# Patient Record
Sex: MALE | Race: WHITE | NOT HISPANIC OR LATINO | Employment: UNEMPLOYED | URBAN - METROPOLITAN AREA
[De-identification: names, ages, dates, MRNs, and addresses within clinical notes are randomized per-mention and may not be internally consistent; named-entity substitution may affect disease eponyms.]

---

## 2023-05-19 ENCOUNTER — TELEPHONE (OUTPATIENT)
Dept: FAMILY MEDICINE | Facility: CLINIC | Age: 33
End: 2023-05-19

## 2023-05-19 NOTE — TELEPHONE ENCOUNTER
Patient reports that his appointment with the provider below was cancelled today.  He states that he had been out of medication for 3-4 days and then got an emergency 3 day supply from his pharmacy. He only has 1 tablet left and needs a refill of his medication. He feels that he is having withdrawal symptoms - brain zapping (jolt), he is irritable and doesn't feel like himself. He fells sick and nauseated.      Nursing advice: In looking in his chart he was set up for a video appointment and is not an established patient.  It was requested that he be seen in person for this initial appointment per policy. Donnie reports that he cannot make it to this appointment in person.  I put the patient on hold to have a conversation with the RN team on how to best support the patient. When I returned to the call the call was gone. I called Donnie back. He stated that he as irritated and hung up the call when asked about the loss of the call.  I inquired on who prescribed the medication originally as I was going to instructed him to call there and request a refill until he can get rescheduled and he could not recall who had previously prescribed the medication. He stated forget it, I'll figure this out myself and then hung up the phone.  I called back and left a message, on a VM that identified the patient, that with his current symptoms and only having one tablet left that he should report immediately to the E.R. for assessment and possible medication refill. I left the phone number [phone] to call if he wanted to reschedule his appointment for long term medication management. To provider below as a FYI.  Thank you. Ghada Floyd R.N.

## 2023-05-19 NOTE — TELEPHONE ENCOUNTER
warren Meeks of patient, calling to ask for a refill of patient's medication. She states patient trying to establish care, but his virtual visit was canceled, needs to be in person. RN stated could not speak with warren unless patient gave consent to communicate, eventually she arrived home to patient and he verbalized consent to speak with Jeanna. RN validated frustration that appointment was canceled because he has not established care with [facility], so we cannot offer a refill of any medications. Patient recommended to seek out previous provider who prescribed medication to ask for a lizette refill while he establishes care, or pharmacy, and if patient experiences withdrawals, then it is appropriate to go to ED for care. Patient and warren verbalized understanding. RN offered to make appointment for patient and he agreed, scheduled in person on 6/2/23 with new PCP. Patient denies any other needs at this time.     Damián Wild, DANIELN, RN, PHN  [facility]

## 2023-06-30 ENCOUNTER — OFFICE VISIT (OUTPATIENT)
Dept: FAMILY MEDICINE | Facility: CLINIC | Age: 33
End: 2023-06-30

## 2023-06-30 VITALS
DIASTOLIC BLOOD PRESSURE: 69 MMHG | OXYGEN SATURATION: 99 % | TEMPERATURE: 97.6 F | SYSTOLIC BLOOD PRESSURE: 100 MMHG | WEIGHT: 143 LBS | BODY MASS INDEX: 22.44 KG/M2 | HEIGHT: 67 IN | HEART RATE: 76 BPM

## 2023-06-30 DIAGNOSIS — F41.0 PANIC DISORDER WITHOUT AGORAPHOBIA: ICD-10-CM

## 2023-06-30 DIAGNOSIS — F33.1 MODERATE EPISODE OF RECURRENT MAJOR DEPRESSIVE DISORDER (H): ICD-10-CM

## 2023-06-30 DIAGNOSIS — G47.00 INSOMNIA, UNSPECIFIED TYPE: ICD-10-CM

## 2023-06-30 DIAGNOSIS — R20.2 PARESTHESIA: ICD-10-CM

## 2023-06-30 DIAGNOSIS — F41.9 ANXIETY: Primary | ICD-10-CM

## 2023-06-30 DIAGNOSIS — F10.21 ALCOHOL DEPENDENCE IN REMISSION (H): ICD-10-CM

## 2023-06-30 LAB — HBA1C MFR BLD: 5 % (ref 0–5.6)

## 2023-06-30 PROCEDURE — 36415 COLL VENOUS BLD VENIPUNCTURE: CPT | Performed by: NURSE PRACTITIONER

## 2023-06-30 PROCEDURE — 83036 HEMOGLOBIN GLYCOSYLATED A1C: CPT | Performed by: NURSE PRACTITIONER

## 2023-06-30 PROCEDURE — 82607 VITAMIN B-12: CPT | Performed by: NURSE PRACTITIONER

## 2023-06-30 PROCEDURE — 84443 ASSAY THYROID STIM HORMONE: CPT | Performed by: NURSE PRACTITIONER

## 2023-06-30 PROCEDURE — 99204 OFFICE O/P NEW MOD 45 MIN: CPT | Performed by: NURSE PRACTITIONER

## 2023-06-30 PROCEDURE — 82728 ASSAY OF FERRITIN: CPT | Performed by: NURSE PRACTITIONER

## 2023-06-30 PROCEDURE — 80053 COMPREHEN METABOLIC PANEL: CPT | Performed by: NURSE PRACTITIONER

## 2023-06-30 RX ORDER — PAROXETINE 30 MG/1
30 TABLET, FILM COATED ORAL EVERY MORNING
Qty: 30 TABLET | Refills: 2 | Status: SHIPPED | OUTPATIENT
Start: 2023-06-30 | End: 2023-10-12

## 2023-06-30 RX ORDER — PAROXETINE 30 MG/1
1 TABLET, FILM COATED ORAL DAILY
COMMUNITY
Start: 2023-05-17 | End: 2023-06-30

## 2023-06-30 RX ORDER — GABAPENTIN 300 MG/1
300 CAPSULE ORAL 3 TIMES DAILY
Qty: 90 CAPSULE | Refills: 2 | Status: SHIPPED | OUTPATIENT
Start: 2023-06-30 | End: 2024-01-12

## 2023-06-30 RX ORDER — GABAPENTIN 300 MG/1
1-2 CAPSULE ORAL PRN
COMMUNITY
Start: 2023-03-22 | End: 2023-06-30

## 2023-06-30 RX ORDER — HYDROXYZINE PAMOATE 25 MG/1
25-50 CAPSULE ORAL 3 TIMES DAILY PRN
Qty: 30 CAPSULE | Refills: 2 | Status: SHIPPED | OUTPATIENT
Start: 2023-06-30

## 2023-06-30 NOTE — PATIENT INSTRUCTIONS
Restart Paxil 30 mg once daily.  Restart gabapentin 300 mg three times per day.  Labs today to look into nerve pain.  I would also like you to see a neurologist for the nerve pain.  I placed a referral to psychiatry too, to help determine if we are treating your mental health correctly.

## 2023-06-30 NOTE — PROGRESS NOTES
Assessment & Plan     Anxiety  He was given a 3 month refill.  I would like him to follow-up with me at that time.  He has had really inconsistent follow-up and we discussed that.  Can try hydroxyzine for anxiety or sleep as needed.   Referral to mental health to help with diagnosis, question if he might have bipolar 2 disorder with some of the statement he made.    - PARoxetine (PAXIL) 30 MG tablet; Take 1 tablet (30 mg) by mouth every morning  - hydrOXYzine (VISTARIL) 25 MG capsule; Take 1-2 capsules (25-50 mg) by mouth 3 times daily as needed for anxiety or other (sleep)  - Adult Mental Health  Referral; Future    Moderate episode of recurrent major depressive disorder (H)  See above.     - PARoxetine (PAXIL) 30 MG tablet; Take 1 tablet (30 mg) by mouth every morning  - Adult Mental Health  Referral; Future    Panic disorder without agoraphobia  See above.     - PARoxetine (PAXIL) 30 MG tablet; Take 1 tablet (30 mg) by mouth every morning  - Adult Mental Health  Referral; Future    Paresthesia  Reviewed podiatry notes, states paresthesia is idiopathic.  Hasn't had work-up.  Possibly related to past heavy alcohol use.  Labs in process.  I would like him to see neurology. I refilled gabapentin x 3 months, but advised he must see neurology for me to continue medication.    - Adult Neurology  Referral; Future  - gabapentin (NEURONTIN) 300 MG capsule; Take 1 capsule (300 mg) by mouth 3 times daily  - TSH with free T4 reflex  - Vitamin B12  - Ferritin  - Hemoglobin A1c  - Comprehensive metabolic panel (BMP + Alb, Alk Phos, ALT, AST, Total. Bili, TP)    Insomnia, unspecified type  Trial of hydroxyzine.     - hydrOXYzine (VISTARIL) 25 MG capsule; Take 1-2 capsules (25-50 mg) by mouth 3 times daily as needed for anxiety or other (sleep)    Alcohol dependence in remission (H)  States no alcohol x 8 months.  Stopped on his own.     - Comprehensive metabolic panel (BMP + Alb, Alk Phos,  ALT, AST, Total. Bili, TP)      Bozena Ramsey, CNP  [facility]    Maryjane Fields is a 32 year old, presenting for the following health issues:  Depression        6/30/2023    10:13 AM   Additional Questions   Roomed by jigna   Accompanied by girlfriend     History of Present Illness       Reason for visit:  I need my pills    He eats 0-1 servings of fruits and vegetables daily.He consumes 3 sweetened beverage(s) daily.He exercises with enough effort to increase his heart rate 60 or more minutes per day.  He exercises with enough effort to increase his heart rate 4 days per week. He is missing 1 dose(s) of medications per week.       New patient to clinic.  Needs med refills.   Has been on Paxil 30 mg.  Takes for anxiety, depression, panic disorder.  Has been out of medication for 5 days and it is making him feel bad. Chart review confirms he has been on this dose.  Appears that it has been prescribed by a few different providers, he hasn't been very consistent in his follow-up.   Saw a psychistrist and did therapy years ago.    Has insomnia. Trazodone left a weird taste in his mouth.  Dad's seroquel works.  Patient has not been personally prescribed Seroquel.    Taking gabapentin 300 mg TID for nerve pain in his feet.  Saw podiatry, hx of club feet with past surgery.  Per podiatry note, neuropathy is idiopathic.  Gabapentin was tried for 1 month and then patient was told to see PCP.  The gabapentin was prescribed in March 2023 per , none since and he is out.  Medication seemed to help a lot.  He wonders if neuropathy is from past alcohol use. Was drinking every day all day long.  12 beers, 6 shots or more per day.  Has not been drinking now for about 8 months, quit on his own.    Smokes marijuana, no other drugs.    Denies any SI or HI.         Review of Systems   ROS: 10 point ROS neg other than the symptoms noted above in the HPI.           Objective    /69   Pulse 76   Temp  "97.6  F (36.4  C)   Ht 1.702 m (5' 7\")   Wt 64.9 kg (143 lb)   SpO2 99%   BMI 22.40 kg/m    Body mass index is 22.4 kg/m .  Physical Exam   GENERAL: healthy, alert and no distress  EYES: Eyes grossly normal to inspection, PERRL and conjunctivae and sclerae normal  BREAST: normal without masses, tenderness or nipple discharge and no palpable axillary masses or adenopathy  MS: no gross musculoskeletal defects noted, no edema  SKIN: no suspicious lesions or rashes  NEURO: Normal strength and tone, mentation intact and speech normal  PSYCH: mentation appears normal and slightly anxious                  "

## 2023-07-01 LAB
ALBUMIN SERPL BCG-MCNC: 4.3 G/DL (ref 3.5–5.2)
ALP SERPL-CCNC: 60 U/L (ref 40–129)
ALT SERPL W P-5'-P-CCNC: 18 U/L (ref 0–70)
ANION GAP SERPL CALCULATED.3IONS-SCNC: 11 MMOL/L (ref 7–15)
AST SERPL W P-5'-P-CCNC: 27 U/L (ref 0–45)
BILIRUB SERPL-MCNC: 0.3 MG/DL
BUN SERPL-MCNC: 11.1 MG/DL (ref 6–20)
CALCIUM SERPL-MCNC: 9.1 MG/DL (ref 8.6–10)
CHLORIDE SERPL-SCNC: 104 MMOL/L (ref 98–107)
CREAT SERPL-MCNC: 1.04 MG/DL (ref 0.67–1.17)
DEPRECATED HCO3 PLAS-SCNC: 25 MMOL/L (ref 22–29)
FERRITIN SERPL-MCNC: 29 NG/ML (ref 31–409)
GFR SERPL CREATININE-BSD FRML MDRD: >90 ML/MIN/1.73M2
GLUCOSE SERPL-MCNC: 95 MG/DL (ref 70–99)
POTASSIUM SERPL-SCNC: 4.7 MMOL/L (ref 3.4–5.3)
PROT SERPL-MCNC: 6.6 G/DL (ref 6.4–8.3)
SODIUM SERPL-SCNC: 140 MMOL/L (ref 136–145)
TSH SERPL DL<=0.005 MIU/L-ACNC: 2.68 UIU/ML (ref 0.3–4.2)
VIT B12 SERPL-MCNC: 633 PG/ML (ref 232–1245)

## 2023-07-08 ENCOUNTER — HEALTH MAINTENANCE LETTER (OUTPATIENT)
Age: 33
End: 2023-07-08

## 2023-10-12 DIAGNOSIS — F33.1 MODERATE EPISODE OF RECURRENT MAJOR DEPRESSIVE DISORDER (H): ICD-10-CM

## 2023-10-12 DIAGNOSIS — F41.9 ANXIETY: ICD-10-CM

## 2023-10-12 DIAGNOSIS — F41.0 PANIC DISORDER WITHOUT AGORAPHOBIA: ICD-10-CM

## 2023-10-12 RX ORDER — PAROXETINE 30 MG/1
TABLET, FILM COATED ORAL
Qty: 30 TABLET | Refills: 1 | Status: SHIPPED | OUTPATIENT
Start: 2023-10-12 | End: 2024-01-12

## 2023-10-12 NOTE — LETTER
October 12, 2023    Donnie Doan  [address] [place] [address]    Dear Donnie,       We recently received a refill request for PARoxetine (PAXIL) 30 MG tablet.  We have refilled this for a one time 30 day supply only because you are due for a:    Follow Up office visit      Please call at your earliest convenience so that there will not be a delay with your future refills.          Thank you,   Your [facility] Team/AL  [phone]

## 2024-01-10 DIAGNOSIS — F33.1 MODERATE EPISODE OF RECURRENT MAJOR DEPRESSIVE DISORDER (H): ICD-10-CM

## 2024-01-10 DIAGNOSIS — F41.9 ANXIETY: ICD-10-CM

## 2024-01-10 DIAGNOSIS — R20.2 PARESTHESIA: ICD-10-CM

## 2024-01-10 DIAGNOSIS — F41.0 PANIC DISORDER WITHOUT AGORAPHOBIA: ICD-10-CM

## 2024-01-11 RX ORDER — GABAPENTIN 300 MG/1
300 CAPSULE ORAL 3 TIMES DAILY
Qty: 90 CAPSULE | Refills: 2 | OUTPATIENT
Start: 2024-01-11

## 2024-01-11 RX ORDER — PAROXETINE 30 MG/1
TABLET, FILM COATED ORAL
Qty: 30 TABLET | Refills: 1 | OUTPATIENT
Start: 2024-01-11

## 2024-01-11 NOTE — TELEPHONE ENCOUNTER
Sent [employer] message due to number being out of service.        Patrice Narayanan

## 2024-01-12 ENCOUNTER — VIRTUAL VISIT (OUTPATIENT)
Dept: FAMILY MEDICINE | Facility: CLINIC | Age: 34
End: 2024-01-12

## 2024-01-12 DIAGNOSIS — R20.2 PARESTHESIA: ICD-10-CM

## 2024-01-12 DIAGNOSIS — K21.9 GASTROESOPHAGEAL REFLUX DISEASE, UNSPECIFIED WHETHER ESOPHAGITIS PRESENT: Primary | ICD-10-CM

## 2024-01-12 DIAGNOSIS — F33.1 MODERATE EPISODE OF RECURRENT MAJOR DEPRESSIVE DISORDER (H): ICD-10-CM

## 2024-01-12 DIAGNOSIS — F41.9 ANXIETY: ICD-10-CM

## 2024-01-12 DIAGNOSIS — F41.0 PANIC DISORDER WITHOUT AGORAPHOBIA: ICD-10-CM

## 2024-01-12 PROCEDURE — 99214 OFFICE O/P EST MOD 30 MIN: CPT | Mod: 95 | Performed by: NURSE PRACTITIONER

## 2024-01-12 RX ORDER — FAMOTIDINE 20 MG/1
20 TABLET, FILM COATED ORAL DAILY
Qty: 30 TABLET | Refills: 11 | Status: SHIPPED | OUTPATIENT
Start: 2024-01-12

## 2024-01-12 RX ORDER — ACETAMINOPHEN 325 MG/1
325-650 TABLET ORAL EVERY 6 HOURS PRN
COMMUNITY
Start: 2022-11-04

## 2024-01-12 RX ORDER — IBUPROFEN 200 MG
200 TABLET ORAL EVERY 6 HOURS PRN
COMMUNITY

## 2024-01-12 RX ORDER — PAROXETINE 30 MG/1
TABLET, FILM COATED ORAL
Qty: 30 TABLET | Refills: 6 | Status: SHIPPED | OUTPATIENT
Start: 2024-01-12 | End: 2024-08-22

## 2024-01-12 RX ORDER — GABAPENTIN 300 MG/1
300 CAPSULE ORAL 2 TIMES DAILY
Qty: 60 CAPSULE | Refills: 6 | Status: SHIPPED | OUTPATIENT
Start: 2024-01-12 | End: 2024-07-24

## 2024-01-12 NOTE — PROGRESS NOTES
Donnie is a 33 year old who is being evaluated via a billable video visit.      How would you like to obtain your AVS? MyChart  If the video visit is dropped, the invitation should be resent by: Text to cell phone: [phone]  Will anyone else be joining your video visit? No          Assessment & Plan     Anxiety  *  - PARoxetine (PAXIL) 30 MG tablet  Dispense: 30 tablet; Refill: 6    Moderate episode of recurrent major depressive disorder (H)    - PARoxetine (PAXIL) 30 MG tablet  Dispense: 30 tablet; Refill: 6    Panic disorder without agoraphobia  *  - PARoxetine (PAXIL) 30 MG tablet  Dispense: 30 tablet; Refill: 6    Paresthesia  *  - gabapentin (NEURONTIN) 300 MG capsule  Dispense: 60 capsule; Refill: 6    Gastroesophageal reflux disease, unspecified whether esophagitis present  *  - famotidine (PEPCID) 20 MG tablet  Dispense: 30 tablet; Refill: 11    -Ongoing anxiety and depression.  Paxil has been refilled.  Psychiatry phone number was given to patient, I strongly suggest that he follow-up with him in regards to his Paxil and diagnosis.  He agreed to the plan.   -I praised him for not drinking alcohol anymore.   -Acid reflux and heartburn symptoms.  Acid reflux triggers were discussed today.  Conservative ways to manage acid reflux was discussed.  He will start with Pepcid and take this once a day or as needed.  The rationale for not prescribing omeprazole was discussed.   -Ongoing nerve pain in his feet.  Gabapentin refilled.                  [facility]   Donnie is a 33 year old, presenting for the following health issues:  No chief complaint on file.        6/30/2023    10:13 AM   Additional Questions   Roomed by jigna   Accompanied by girlfriend     Taking gabapentin 300 mg once or twice a day for nerve pain in his feet. Saw podiatry, hx of club feet with past surgery. Per podiatry note, neuropathy is idiopathic. Medication seems to  "help a lot. He takes it sometimes TID if the pain is severe. He used to drink every day \"all day long\".(12 beers, 6 shots or more per day). Has not been drinking now for about a year, quit on his own.     Has been on Paxil for at least 7 years.  He stated that if he misses a dose, he will have anger and irritability.  He is currently out of medication and needs a refill.  He is questioning if Paxil is the correct medication for him.  He was told in the past that he potentially has bipolar.  He would like to follow-up with psychiatry.  This was ordered back in June.  He denied any suicidal or homicidal thoughts or plans.  He is currently in a stable relationship with his girlfriend.   Has noticed acid reflux and heartburn.  He took Prilosec, and this really helped him.  He stated that he is a picky eater, and loves eating pizza.  He also enjoys ketchup.  He will notice his symptoms will be worse with these additives.  He does not smoke cigarettes, but does smoke marijuana in the evening prior to bed.  He has tried Tums and it does not help.  He denied any nausea or vomiting.  He denied any black or tarry stools.  He denied any weight loss.     HPI             Review of Systems         Objective           Vitals:  No vitals were obtained today due to virtual visit.    Physical Exam   GENERAL: Healthy, alert and no distress  EYES: Eyes grossly normal to inspection.  No discharge or erythema, or obvious scleral/conjunctival abnormalities.  RESP: No audible wheeze, cough, or visible cyanosis.  No visible retractions or increased work of breathing.    SKIN: Visible skin clear. No significant rash, abnormal pigmentation or lesions.  NEURO: Cranial nerves grossly intact.  Mentation and speech appropriate for age.  PSYCH: Mentation appears normal, affect normal/bright, judgement and insight intact, normal speech and appearance well-groomed.    Office Visit on 06/30/2023   Component Date Value Ref Range Status    TSH " 06/30/2023 2.68  0.30 - 4.20 uIU/mL Final    Vitamin B12 06/30/2023 633  232 - 1,245 pg/mL Final    Ferritin 06/30/2023 29 (L)  31 - 409 ng/mL Final    Hemoglobin A1C 06/30/2023 5.0  0.0 - 5.6 % Final    Normal <5.7%   Prediabetes 5.7-6.4%    Diabetes 6.5% or higher     Note: Adopted from ADA consensus guidelines.    Sodium 06/30/2023 140  136 - 145 mmol/L Final    Potassium 06/30/2023 4.7  3.4 - 5.3 mmol/L Final    Chloride 06/30/2023 104  98 - 107 mmol/L Final    Carbon Dioxide (CO2) 06/30/2023 25  22 - 29 mmol/L Final    Anion Gap 06/30/2023 11  7 - 15 mmol/L Final    Urea Nitrogen 06/30/2023 11.1  6.0 - 20.0 mg/dL Final    Creatinine 06/30/2023 1.04  0.67 - 1.17 mg/dL Final    Calcium 06/30/2023 9.1  8.6 - 10.0 mg/dL Final    Glucose 06/30/2023 95  70 - 99 mg/dL Final    Alkaline Phosphatase 06/30/2023 60  40 - 129 U/L Final    AST 06/30/2023 27  0 - 45 U/L Final    Reference intervals for this test were updated on 6/12/2023 to more accurately reflect our healthy population. There may be differences in the flagging of prior results with similar values performed with this method. Interpretation of those prior results can be made in the context of the updated reference intervals.    ALT 06/30/2023 18  0 - 70 U/L Final    Reference intervals for this test were updated on 6/12/2023 to more accurately reflect our healthy population. There may be differences in the flagging of prior results with similar values performed with this method. Interpretation of those prior results can be made in the context of the updated reference intervals.      Protein Total 06/30/2023 6.6  6.4 - 8.3 g/dL Final    Albumin 06/30/2023 4.3  3.5 - 5.2 g/dL Final    Bilirubin Total 06/30/2023 0.3  <=1.2 mg/dL Final    GFR Estimate 06/30/2023 >90  >60 mL/min/1.73m2 Final               Video-Visit Details    Type of service:  Video Visit     Originating Location (pt. Location): Home    Distant Location (provider location):  On-site  Platform  used for Video Visit: Fred

## 2024-07-24 DIAGNOSIS — R20.2 PARESTHESIA: ICD-10-CM

## 2024-07-24 RX ORDER — GABAPENTIN 300 MG/1
300 CAPSULE ORAL 2 TIMES DAILY
Qty: 60 CAPSULE | Refills: 2 | Status: SHIPPED | OUTPATIENT
Start: 2024-07-24 | End: 2024-08-27

## 2024-08-22 ENCOUNTER — TELEPHONE (OUTPATIENT)
Dept: INTERNAL MEDICINE | Facility: CLINIC | Age: 34
End: 2024-08-22

## 2024-08-22 DIAGNOSIS — F41.9 ANXIETY: ICD-10-CM

## 2024-08-22 DIAGNOSIS — F41.0 PANIC DISORDER WITHOUT AGORAPHOBIA: ICD-10-CM

## 2024-08-22 DIAGNOSIS — F33.1 MODERATE EPISODE OF RECURRENT MAJOR DEPRESSIVE DISORDER (H): ICD-10-CM

## 2024-08-22 RX ORDER — PAROXETINE 30 MG/1
TABLET, FILM COATED ORAL
Qty: 30 TABLET | Refills: 0 | Status: SHIPPED | OUTPATIENT
Start: 2024-08-22 | End: 2024-08-27

## 2024-08-22 NOTE — TELEPHONE ENCOUNTER
Medication Question or Refill    Contacts       Contact Date/Time Type Contact Phone/Fax    08/22/2024 01:55 PM CDT Phone (Incoming) Donnie Doan (Self) [phone] (H)            What medication are you calling about (include dose and sig)?: PARoxetine (PAXIL) 30 MG tablet     Preferred Pharmacy:   [id number] - [place], [place] - [address]  [address] [place] [address]  Phone: [phone] Fax: [phone]    [id number] - [place], [place] - [address]  [address] [address]  Phone: [phone] Fax: [phone]    [id number] - [place], [place] - [address]  [address] [address]  Phone: [phone] Fax: [phone]    [id number] [place], [place] - [address] [facility]  [address] [address]  Phone: [phone] Fax: [phone]    [id number] - [place], [place] - [address] AT [facility] [address]  [address] [place] [address]  Phone: [phone] Fax: [phone]      Controlled Substance Agreement on file:   CSA -- Patient Level:    CSA: None found at the patient level.       Who prescribed the medication?: Bozena Ramsey    Do you need a refill? Yes    When did you use the medication last? 08/22/2024    Patient offered an appointment? Yes:     Do you have any questions or concerns?  Yes: Ran out of medicine as of today 08/22/2024      Could we send this information to you in [place] or would you prefer to receive a phone call?:   No preference   Okay to leave a detailed message?: Yes at Home number on file [phone] (home) or Cell number on file:    Telephone Information:   Mobile [phone]

## 2024-08-27 ENCOUNTER — VIRTUAL VISIT (OUTPATIENT)
Dept: FAMILY MEDICINE | Facility: CLINIC | Age: 34
End: 2024-08-27

## 2024-08-27 DIAGNOSIS — F41.9 ANXIETY: ICD-10-CM

## 2024-08-27 DIAGNOSIS — F33.1 MODERATE EPISODE OF RECURRENT MAJOR DEPRESSIVE DISORDER (H): ICD-10-CM

## 2024-08-27 DIAGNOSIS — F41.0 PANIC DISORDER WITHOUT AGORAPHOBIA: ICD-10-CM

## 2024-08-27 DIAGNOSIS — R20.2 PARESTHESIA: ICD-10-CM

## 2024-08-27 PROCEDURE — 99213 OFFICE O/P EST LOW 20 MIN: CPT | Mod: 95 | Performed by: PHYSICIAN ASSISTANT

## 2024-08-27 RX ORDER — PAROXETINE 30 MG/1
TABLET, FILM COATED ORAL
Qty: 90 TABLET | Refills: 1 | Status: SHIPPED | OUTPATIENT
Start: 2024-08-27

## 2024-08-27 RX ORDER — GABAPENTIN 300 MG/1
300 CAPSULE ORAL 2 TIMES DAILY
Qty: 60 CAPSULE | Refills: 2 | Status: SHIPPED | OUTPATIENT
Start: 2024-08-27

## 2024-08-27 ASSESSMENT — PATIENT HEALTH QUESTIONNAIRE - PHQ9
SUM OF ALL RESPONSES TO PHQ QUESTIONS 1-9: 8
SUM OF ALL RESPONSES TO PHQ QUESTIONS 1-9: 8
10. IF YOU CHECKED OFF ANY PROBLEMS, HOW DIFFICULT HAVE THESE PROBLEMS MADE IT FOR YOU TO DO YOUR WORK, TAKE CARE OF THINGS AT HOME, OR GET ALONG WITH OTHER PEOPLE: SOMEWHAT DIFFICULT

## 2024-08-27 NOTE — PROGRESS NOTES
Donnie is a 33 year old who is being evaluated via a billable video visit.    How would you like to obtain your AVS? MyChart  If the video visit is dropped, the invitation should be resent by: Text to cell phone: [phone]  Will anyone else be joining your video visit? No      Assessment & Plan       ICD-10-CM    1. Anxiety  F41.9 PARoxetine (PAXIL) 30 MG tablet     Adult Mental Health  Referral      2. Moderate episode of recurrent major depressive disorder (H)  F33.1 PARoxetine (PAXIL) 30 MG tablet     Adult Mental Health  Referral      3. Panic disorder without agoraphobia  F41.0 PARoxetine (PAXIL) 30 MG tablet     Adult Mental Health  Referral      4. Paresthesia  R20.2 gabapentin (NEURONTIN) 300 MG capsule        medical conditions are stable. meds refilled.  Referral placed for counseling.   warning signs discussed.  Needs to establish care. Recheck 6 months.         Subjective   Donnie is a 33 year old, presenting for the following health issues:  Recheck Medication        History of Present Illness       Reason for visit:  Recheck meds   He is taking medications regularly.     Patient is doing a virtual visit today to get med refill that.  He has a history of anxiety and been on paroxetine for over 7 years.  He states that helps.  He has had a little bit of increase of irritability and anger.  He is not in counseling.  He was seen at outside facility and had an insurance change.  He also needs a refill of his gabapentin that he uses for paresthesias in his feet.  He states that helps.  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        Review of Systems  Constitutional, HEENT, cardiovascular, pulmonary, gi and gu systems are negative, except as otherwise noted.      Objective           Vitals:  No vitals were obtained today due to virtual visit.    Physical Exam   GENERAL: alert and no distress  EYES: Eyes grossly normal to inspection.  No discharge or erythema, or obvious  scleral/conjunctival abnormalities.  RESP: No audible wheeze, cough, or visible cyanosis.    SKIN: Visible skin clear. No significant rash, abnormal pigmentation or lesions.  NEURO: Cranial nerves grossly intact.  Mentation and speech appropriate for age.  PSYCH: Appropriate affect, tone, and pace of words          Video-Visit Details    Type of service:  Video Visit     Originating Location (pt. Location): Home    Distant Location (provider location):  Off-site  Platform used for Video Visit: [facility]  Signed Electronically by: Derrek Hernadez PA-C

## 2024-08-31 ENCOUNTER — HEALTH MAINTENANCE LETTER (OUTPATIENT)
Age: 34
End: 2024-08-31